# Patient Record
Sex: MALE | Race: WHITE | NOT HISPANIC OR LATINO | Employment: PART TIME | URBAN - METROPOLITAN AREA
[De-identification: names, ages, dates, MRNs, and addresses within clinical notes are randomized per-mention and may not be internally consistent; named-entity substitution may affect disease eponyms.]

---
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
## 2023-02-09 ENCOUNTER — PRE VISIT (OUTPATIENT)
Dept: SURGERY | Facility: CLINIC | Age: 18
End: 2023-02-09

## 2023-02-09 ENCOUNTER — OFFICE VISIT (OUTPATIENT)
Dept: SURGERY | Facility: CLINIC | Age: 18
End: 2023-02-09
Attending: INTERNAL MEDICINE
Payer: COMMERCIAL

## 2023-02-09 VITALS
HEIGHT: 70 IN | WEIGHT: 201.1 LBS | DIASTOLIC BLOOD PRESSURE: 80 MMHG | SYSTOLIC BLOOD PRESSURE: 135 MMHG | HEART RATE: 63 BPM | BODY MASS INDEX: 28.79 KG/M2 | OXYGEN SATURATION: 97 %

## 2023-02-09 DIAGNOSIS — L98.8 PILONIDAL DISEASE: Primary | ICD-10-CM

## 2023-02-09 DIAGNOSIS — L05.91 PILONIDAL CYST WITHOUT INFECTION: ICD-10-CM

## 2023-02-09 PROCEDURE — 99203 OFFICE O/P NEW LOW 30 MIN: CPT | Performed by: NURSE PRACTITIONER

## 2023-02-09 ASSESSMENT — PAIN SCALES - GENERAL: PAINLEVEL: NO PAIN (0)

## 2023-02-09 NOTE — NURSING NOTE
"Chief Complaint   Patient presents with     New Patient       Vitals:    02/09/23 0734   BP: 135/80   BP Location: Left arm   Patient Position: Sitting   Cuff Size: Adult Regular   Pulse: 63   SpO2: 97%   Weight: 201 lb 1.6 oz   Height: 5' 10\"       Body mass index is 28.85 kg/m .     Mynor Underwood, EMT- P    "

## 2023-02-09 NOTE — PROGRESS NOTES
"Colon and Rectal Surgery Consult Clinic Note    Date: 2023     Referring provider:  Abiola Warner MD  [address] [place] [id number]     RE: Jonathan Bauer  : 2005  MAX: 2023    Jonathan Bauer is a very pleasant 17 year old male who presents today for pilonidal disease.    HPI:  Has had a cyst by tailbone about 6 months. Used to drain but no symptoms for about a month. No procedures on this in the past. Otherwise healthy. Does not smoke. 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    Physical Examination: Exam was chaperoned by Mynor Underwood, EMT-P  /80 (BP Location: Left arm, Patient Position: Sitting, Cuff Size: Adult Regular)   Pulse 63   Ht 5' 10\"   Wt 201 lb 1.6 oz   SpO2 97%   BMI 28.85 kg/m    General: alert, oriented, in no acute distress, sitting comfortably  HEENT: mucous membranes moist  Perianal external examination:  Three pilonidal pits at the top of the sea cleft, one larger and two smaller pits. Hair removed from one. No surrounding induration or fluctuance. No drainage.    Digital rectal examination: Was deferred    Anoscopy: Was deferred      Assessment/Plan: 17 year old male with pilonidal disease. He does have three pits in the  cleft but no active abscess or cyst. Has never required any procedures at this site but did have drainage for about 5 months, previously. Currently asymptomatic. We discussed options of surgical intervention versus watching the site to see if he gets recurrent disease. He would prefer to wait at this time. Discussed postop wound healing if he does decide on surgical intervention in the future and recommended he try to do this when he will not be playing baseball. Return at anytime for any recurrent symptoms, questions, or concerns. Patient's questions were answered to his stated satisfaction and he is in agreement with this plan.     Medical history:  No past medical history on " "file.    Surgical history:  No past surgical history on file.    Problem list:  There are no problems to display for this patient.      Medications:  No current outpatient medications on file.       Allergies:  No Known Allergies    Family history:  No family history on file.    Social history:  Social History     Tobacco Use     Smoking status: Not on file     Smokeless tobacco: Not on file   Substance Use Topics     Alcohol use: Not on file    Marital status: single.  Occupation: student    Nursing Notes:   Mynor Underwood, EMT  2/9/2023  7:38 AM  Signed  Chief Complaint   Patient presents with     New Patient       Vitals:    02/09/23 0734   BP: 135/80   BP Location: Left arm   Patient Position: Sitting   Cuff Size: Adult Regular   Pulse: 63   SpO2: 97%   Weight: 201 lb 1.6 oz   Height: 5' 10\"       Body mass index is 28.85 kg/m .     Mynor Underwood, EMT- P         30 minutes spent on the date of encounter (excluding time performing procedures) performing chart review, history and exam, documentation and further activities as noted above     DESMOND Estrada, NP-C  Colon and Rectal Surgery   [facility]    This note was created using speech recognition software and may contain unintended word substitutions.    "